# Patient Record
Sex: MALE | Race: WHITE | NOT HISPANIC OR LATINO | Employment: FULL TIME | URBAN - METROPOLITAN AREA
[De-identification: names, ages, dates, MRNs, and addresses within clinical notes are randomized per-mention and may not be internally consistent; named-entity substitution may affect disease eponyms.]

---

## 2018-01-10 ENCOUNTER — HOSPITAL ENCOUNTER (EMERGENCY)
Facility: HOSPITAL | Age: 38
Discharge: HOME | End: 2018-01-10
Attending: EMERGENCY MEDICINE
Payer: OTHER GOVERNMENT

## 2018-01-10 VITALS
TEMPERATURE: 98 F | HEIGHT: 70 IN | RESPIRATION RATE: 18 BRPM | WEIGHT: 158 LBS | DIASTOLIC BLOOD PRESSURE: 84 MMHG | SYSTOLIC BLOOD PRESSURE: 136 MMHG | HEART RATE: 90 BPM | OXYGEN SATURATION: 100 % | BODY MASS INDEX: 22.62 KG/M2

## 2018-01-10 DIAGNOSIS — N45.1 EPIDIDYMITIS: Primary | ICD-10-CM

## 2018-01-10 DIAGNOSIS — N50.819 TESTICULAR PAIN: ICD-10-CM

## 2018-01-10 LAB
BILIRUB UR QL STRIP: NEGATIVE
CLARITY UR: CLEAR
COLOR UR: NORMAL
GLUCOSE UR QL STRIP: NEGATIVE
HGB UR QL STRIP: NEGATIVE
KETONES UR QL STRIP: NEGATIVE
LEUKOCYTE ESTERASE UR QL STRIP: NEGATIVE
NITRITE UR QL STRIP: NEGATIVE
PH UR STRIP: 7 [PH] (ref 5–8)
PROT UR QL STRIP: NEGATIVE
SP GR UR STRIP: 1 (ref 1–1.03)
URN SPEC COLLECT METH UR: NORMAL
UROBILINOGEN UR STRIP-ACNC: NEGATIVE EU/DL

## 2018-01-10 PROCEDURE — 81003 URINALYSIS AUTO W/O SCOPE: CPT

## 2018-01-10 PROCEDURE — 99284 EMERGENCY DEPT VISIT MOD MDM: CPT

## 2018-01-10 RX ORDER — OXYCODONE AND ACETAMINOPHEN 5; 325 MG/1; MG/1
1 TABLET ORAL EVERY 6 HOURS PRN
Qty: 12 TABLET | Refills: 0 | Status: SHIPPED | OUTPATIENT
Start: 2018-01-10

## 2018-01-10 RX ORDER — LEVOFLOXACIN 500 MG/1
500 TABLET, FILM COATED ORAL DAILY
Qty: 10 TABLET | Refills: 0 | Status: SHIPPED | OUTPATIENT
Start: 2018-01-10 | End: 2018-01-20

## 2018-01-10 RX ORDER — BUPROPION HYDROCHLORIDE 150 MG/1
150 TABLET ORAL DAILY
COMMUNITY

## 2018-01-10 NOTE — DISCHARGE INSTRUCTIONS
You have been evaluated in the emergency department secondary to testicular pain.  You're being treated for epididymitis with antibiotics and pain medicine.    Take Levaquin as prescribed.  Percocet as needed for pain which does not respond to Tylenol or ibuprofen.  Be aware, Percocet is sedating.  Continue with ice compresses.    [place]evaluation of our treatment plan and further management.    Return to this ED if you become febrile, if unable to tolerate pain, if swelling worsens, if any other problem occur.

## 2018-01-10 NOTE — ED PROVIDER NOTES
"Encounter Date: 1/10/2018    SCRIBE #1 NOTE: I, DelphineToryTanesha Niko, am scribing for, and in the presence of,  Sunil Spears PA-C. I have scribed the following portions of the note - Other sections scribed: HPI, ROS.       History     Chief Complaint   Patient presents with    Testicle Pain     was seen on [employer].  sent here to r/o testicle torsion.  left testicle pains     CC: "Knot" ;Testicle Pain    36 y/o male with H pylori ulcer and Restless leg syndrome presents to the ED c/o acute onset "knot" to posterior aspect of L testicle with associated pain and L testicular pain that radiates to L sided abdomen and to L sided lumbar back that started last wk. The pain is severe (6/10); palpation worsens the pain. The patient states that the pain was worse 4 days ago, and it has alleviated slightly upon onset. The patient attempted ice and ibuprofen (last dose yesterday) with no relief. The patient states that his father is paralyzed on the L side, so he recently had to help  his father, which he is unsure is attributing to the symptoms. The patient denies recent trauma or hx of abdominal surgeries. The patient denies dysuria, N/V/D, or fever. No other symptoms reported.      The history is provided by the patient. No  was used.     Review of patient's allergies indicates:  No Known Allergies  Past Medical History:   Diagnosis Date    Depression     H pylori ulcer     Restless leg syndrome      Past Surgical History:   Procedure Laterality Date    KNEE SURGERY       History reviewed. No pertinent family history.  Social History   Substance Use Topics    Smoking status: Current Every Day Smoker     Packs/day: 1.00     Types: Cigarettes    Smokeless tobacco: Former User    Alcohol use Yes      Comment: occ     Review of Systems   Constitutional: Negative for chills and fever.   HENT: Negative for rhinorrhea.    Eyes: Negative for redness.   Respiratory: Negative for cough and " "shortness of breath.    Cardiovascular: Negative for chest pain.   Gastrointestinal: Positive for abdominal pain (L sided). Negative for diarrhea, nausea and vomiting.   Genitourinary: Positive for testicular pain (L sided). Negative for difficulty urinating and dysuria.   Musculoskeletal: Positive for back pain (L sided lumbar).   Skin: Negative for rash.        (+) "knot" to posterior aspect of L sided testicle   Neurological: Negative for headaches.       Physical Exam     Initial Vitals [01/10/18 1513]   BP Pulse Resp Temp SpO2   (!) 145/92 98 16 98.3 °F (36.8 °C) 99 %      MAP       109.67         Physical Exam    Nursing note and vitals reviewed.  Constitutional: He appears well-developed and well-nourished. He is not diaphoretic. No distress.   HENT:   Head: Normocephalic and atraumatic.   Eyes: Conjunctivae and EOM are normal. Pupils are equal, round, and reactive to light.   Neck: Normal range of motion. Neck supple.   Cardiovascular: Normal heart sounds and intact distal pulses.   Pulmonary/Chest: Breath sounds normal. No respiratory distress. He has no wheezes.   Abdominal: Soft. Bowel sounds are normal.   Mild tenderness to palpation to left inguinal region.  No obvious lymphadenopathy.  No obvious herniation.   Genitourinary:         Musculoskeletal: Normal range of motion. He exhibits no tenderness.   Neurological: He is alert and oriented to person, place, and time. He has normal strength.   Skin: Skin is warm and dry. Capillary refill takes less than 2 seconds. No rash and no abscess noted. No erythema.   Psychiatric: He has a normal mood and affect. His behavior is normal. Judgment and thought content normal.         ED Course   Procedures  Labs Reviewed   URINALYSIS             Medical Decision Making:   Initial Assessment:   37-year-old male chief complaint left testicle pain ×1 week.  Differential Diagnosis:   UTI, STI, urethritis, epididymitis, testicular torsion, varicocele, " spermatocele  Clinical Tests:   Lab Tests: Ordered and Reviewed  Radiological Study: Ordered and Reviewed  ED Management:  Patient overall well-appearing, in no acute distress, afebrile, vitals within normal limits.    Patient presents to ED complaining of testicular pain ×1 week.  He states pain is lessened over the course the past few days, however left testicle is persistently enlarged and tender.  Patient does admit to mild left lower abdominal pain, left-sided lumbar back pain, and addition to testicular pain.  He denies dysuria, hematuria, increased urinary frequency, or strong odor to urine.  He denies nausea or vomiting.  No fever or chills.  He denies history of abdominal surgeries or testicular surgeries.  On exam there is slightly enlarged left testicle compared to contralateral.  The posterior aspect of testicle does exhibit a hard, exquisitely tender mass.  No overlying scrotal skin changes or scrotal wall thickening.  No open wound.  No inguinal lymphadenopathy.  No penile discharge or penile pain or swelling.  Very mild left lower quadrant tenderness to palpation.  Abdomen is overall soft with normal bowel sounds, no rebound or guarding.  No peritoneal signs.  Testicular ultrasound with evidence of epididymitis.  Patient denies anal intercourse.  He denies new sexual partners.  Urinalysis without evidence of infection or hematuria.  I will treat with Levaquin for the next 10 days.  Percocet for pain.  [place] next [place].  I do feel he is safe and stable for discharge without further intervention.  Pt does understand and agree with treatment plan.  Return precautions given.  Other:   I have discussed this case with another health care provider.       <> Summary of the Discussion: I have discussed this case with Dr. Spears.            Scribe Attestation:   Scribe #1: I performed the above scribed service and the documentation accurately  describes the services I performed. I attest to the accuracy of the note.    Attending Attestation:           Physician Attestation for Scribe:  Physician Attestation Statement for Scribe #1: I, Sunil Spears PA-C, reviewed documentation, as scribed by Clary Pope in my presence, and it is both accurate and complete.                 ED Course      Clinical Impression:   The primary encounter diagnosis was Epididymitis. A diagnosis of Testicular pain was also pertinent to this visit.    Disposition:   Disposition: Discharged  Condition: Stable                        Sunil Ramos PA-C  01/10/18 [id number]

## 2019-04-11 ENCOUNTER — HOSPITAL ENCOUNTER (EMERGENCY)
Facility: HOSPITAL | Age: 39
Discharge: HOME | End: 2019-04-11
Attending: EMERGENCY MEDICINE
Payer: OTHER GOVERNMENT

## 2019-04-11 VITALS
HEIGHT: 69 IN | WEIGHT: 154 LBS | HEART RATE: 63 BPM | TEMPERATURE: 98 F | SYSTOLIC BLOOD PRESSURE: 119 MMHG | RESPIRATION RATE: 18 BRPM | OXYGEN SATURATION: 98 % | BODY MASS INDEX: 22.81 KG/M2 | DIASTOLIC BLOOD PRESSURE: 70 MMHG

## 2019-04-11 DIAGNOSIS — S89.91XA RIGHT KNEE INJURY: Primary | ICD-10-CM

## 2019-04-11 PROCEDURE — 96372 THER/PROPH/DIAG INJ SC/IM: CPT | Mod: 59

## 2019-04-11 PROCEDURE — 29505 APPLICATION LONG LEG SPLINT: CPT | Mod: RT

## 2019-04-11 PROCEDURE — 99284 EMERGENCY DEPT VISIT MOD MDM: CPT | Mod: 25

## 2019-04-11 PROCEDURE — 63600175 PHARM REV CODE 636 W HCPCS: Performed by: NURSE PRACTITIONER

## 2019-04-11 RX ORDER — KETOROLAC TROMETHAMINE 30 MG/ML
30 INJECTION, SOLUTION INTRAMUSCULAR; INTRAVENOUS
Status: COMPLETED | OUTPATIENT
Start: 2019-04-11 | End: 2019-04-11

## 2019-04-11 RX ORDER — NAPROXEN 500 MG/1
500 TABLET ORAL EVERY 12 HOURS PRN
Qty: 15 TABLET | Refills: 0 | Status: SHIPPED | OUTPATIENT
Start: 2019-04-11

## 2019-04-11 RX ADMIN — KETOROLAC TROMETHAMINE 30 MG: 30 INJECTION, SOLUTION INTRAMUSCULAR at 02:04

## 2019-04-11 NOTE — DISCHARGE INSTRUCTIONS
Take medications as prescribed.  Use ice, compression, and elevation to reduce pain and swelling when possible.  Follow up with Orthopedics for further testing and treatment as discussed including possible MRI.  Return to the emergency department for any new or worsening symptoms or as needed for any additional concerns.    Thank you for coming to our Emergency Department today. It is important to remember that some problems are difficult to diagnose and may not be found during your first visit. Be sure to follow up with your primary care doctor.  If you do not have one, you may contact the one listed on your discharge paperwork or you may also call the [facility] Appointment Desk at [phone] to schedule an appointment with one.     Return to the ER with any questions/concerns, new/concerning symptoms, worsening or failure to improve. Do not drive or make any important decisions for 24 hours if you have received any pain medications, sedatives or mood altering drugs during your ER visit.

## 2019-04-11 NOTE — ED TRIAGE NOTES
"Pt. Reports he was playing softball on yesterday' pt. Reports he feel a "pop" in his right knee. Pt. Reports swollen to the inner part of his knee, pt. States he is unable to bear weight and cannot twist his leg. Pain is 6/10, pt. States he took motrin at 2 hours prior to coming to the ED.   "

## 2019-04-11 NOTE — ED PROVIDER NOTES
Encounter Date: 4/11/2019    SCRIBE #1 NOTE: I, Samuel Easley, am scribing for, and in the presence of,  Yoseph Nava NP. I have scribed the following portions of the note - Other sections scribed: HPI, ROS.       History     Chief Complaint   Patient presents with    Knee Pain     pt reports injurying (R) knee playing softball yesteday about 7:00 pm     CC: Knee Pain    HPI: This 38 y.o. Male with depression, h pylori ulcer and restless leg syndrome presents to the ED for an evaluation of R knee pain which began after playing softball yesterday around 7pm. Pt reports while playing his cleat got stuck in the dirt and he twisted his knee. His pain is worse on the medial side. Pt took motrin with no relief. She denies fever, rhinorrhea, abdominal pain, SOB, dysuria or neck pain.    The history is provided by the patient. No  was used.     Review of patient's allergies indicates:  No Known Allergies  Past Medical History:   Diagnosis Date    Depression     H pylori ulcer     Restless leg syndrome      Past Surgical History:   Procedure Laterality Date    KNEE SURGERY       History reviewed. No pertinent family history.  Social History     Tobacco Use    Smoking status: Current Every Day Smoker     Packs/day: 1.00     Types: Cigarettes    Smokeless tobacco: Former User   Substance Use Topics    Alcohol use: Yes     Comment: occ    Drug use: Never     Review of Systems   Constitutional: Negative for chills and fever.   HENT: Negative for ear pain, rhinorrhea and sore throat.    Eyes: Negative for redness.   Respiratory: Negative for shortness of breath.    Cardiovascular: Negative for chest pain.   Gastrointestinal: Negative for abdominal pain, diarrhea, nausea and vomiting.   Genitourinary: Negative for dysuria and hematuria.   Musculoskeletal: Positive for arthralgias. Negative for back pain and neck pain.   Skin: Negative for rash.   Neurological: Negative for weakness, numbness and  headaches.   Hematological: Does not bruise/bleed easily.       Physical Exam     Initial Vitals [04/11/19 1314]   BP Pulse Resp Temp SpO2   130/79 84 16 98.8 °F (37.1 °C) 97 %      MAP       --         Physical Exam    Vitals reviewed.  Constitutional: He appears well-developed and well-nourished. He is not diaphoretic. He is active and cooperative.  Non-toxic appearance. He does not have a sickly appearance. He does not appear ill. No distress.   HENT:   Head: Normocephalic and atraumatic.   Right Ear: External ear normal.   Left Ear: External ear normal.   Nose: Nose normal.   Eyes: EOM are normal. Right eye exhibits no discharge. Left eye exhibits no discharge.   Neck: Normal range of motion. Neck supple. No tracheal deviation present.   Cardiovascular: Normal rate.   Pulmonary/Chest: No stridor. No respiratory distress.   Abdominal: Soft. He exhibits no distension. There is no tenderness.   Musculoskeletal:        Right knee: He exhibits swelling. He exhibits normal range of motion, no deformity, no LCL laxity, normal patellar mobility and no MCL laxity. Tenderness found. Medial joint line tenderness noted. No lateral joint line and no patellar tendon tenderness noted.   Tenderness to the medial aspect of the knee.  No anterior, posterior, or lateral tenderness. No ligamentous laxity.  Negative anterior and posterior drawer test.  Mild swelling over the medial aspect.  Pain is exacerbated with ambulation and weight-bearing, however patient is ambulating with a steady but antalgic gait.   Neurological: He is alert and oriented to person, place, and time. He has normal strength. No cranial nerve deficit.   Skin: Skin is warm and dry.   Psychiatric: He has a normal mood and affect. His behavior is normal. Judgment and thought content normal.         ED Course   Procedures  Labs Reviewed - No data to display       Imaging Results          X-Ray Knee 3 View Right (Final result)  Result time 04/11/19 14:57:31     Final result by Steve Gilbert MD (04/11/19 14:57:31)                 Impression:      Normal findings      Electronically signed by: Steve Gilbert MD  Date:    04/11/2019  Time:    14:57             Narrative:    EXAMINATION:  XR KNEE 3 VIEW RIGHT    CLINICAL HISTORY:  Unspecified injury of right lower leg, initial encounter    TECHNIQUE:  AP standing of both knees, Merchant views of both knees as well as a lateral view of the right knee were performed.    COMPARISON:  None    FINDINGS:  Bones are well mineralized.  Alignment is satisfactory and joint spaces are well maintained.  No fracture or dislocation.  No significant degenerative changes.  No soft tissue abnormality appreciated.                                 Medical Decision Making:   History:   Old Medical Records: I decided to obtain old medical records.  Differential Diagnosis:   Fracture, dislocation, ligamentous injury, septic joint, others  Clinical Tests:   Radiological Study: Ordered and Reviewed  ED Management:  HPI and physical exam is as above.  History of the patient's injuries concerning for possible ligament injury, however there is no appreciable ligamentous laxity.  Anterior and posterior drawer test is negative. X-rays without evidence of fracture, dislocation, effusion, or other acute abnormality.  There is no evidence of a septic joint and the patient's pain is traumatic.  Treated with Toradol in the ED.  Applied a knee immobilizer and patient provided with crutches.  Prescribed naproxen at discharge. Advised patient to follow up with Orthopedics for re-evaluation and further management.  ED return precautions given. All questions regarding diagnosis and plan were answered to the patient's fullest possible satisfaction. Patient expressed understanding of diagnosis, discharge instructions, and return precautions.      My attending physician was available for consultation during this case.            Scribe Attestation:   Scribe #1: I performed  the above scribed service and the documentation accurately describes the services I performed. I attest to the accuracy of the note.    Attending Attestation:           Physician Attestation for Scribe:  Physician Attestation Statement for Scribe #1: I, Yoseph Nava NP, reviewed documentation, as scribed by Samuel Easley in my presence, and it is both accurate and complete.                    Clinical Impression:       ICD-10-CM ICD-9-CM   1. Right knee injury S89.91XA 959.7         Disposition:   Disposition: Discharged  Condition: Stable                        Yoseph Nava NP  04/11/19 2104